# Patient Record
Sex: FEMALE | URBAN - METROPOLITAN AREA
[De-identification: names, ages, dates, MRNs, and addresses within clinical notes are randomized per-mention and may not be internally consistent; named-entity substitution may affect disease eponyms.]

---

## 2023-10-13 ENCOUNTER — TELEPHONE (OUTPATIENT)
Dept: OBGYN | Facility: CLINIC | Age: 33
End: 2023-10-13

## 2023-10-13 NOTE — TELEPHONE ENCOUNTER
Attempted to call patient regarding message below and received voicemail. Left voice message with clinic's call back number.

## 2023-10-13 NOTE — TELEPHONE ENCOUNTER
Patient called back. Patient has an appointment with [facility] in [place] on 10/27 and the [place] location for 11/01. Provided patient with financial resources. Encouraged patient to call S for any assistance/guidance if needed. Patient appreciative.

## 2023-10-13 NOTE — TELEPHONE ENCOUNTER
"\"Have some questions about  services\"    Pt reports that she is 16 weeks pregnant and was shocked by the news   She is done having kids and looking for appointment for     Let pt know that we do not offer those services at the gestation that she is - referred to [facility] - gave [facility] contacts for [facility]    Pt denies additional questions or concerns    BORIS Brooks  [facility]      "